# Patient Record
Sex: MALE | Race: WHITE | URBAN - METROPOLITAN AREA
[De-identification: names, ages, dates, MRNs, and addresses within clinical notes are randomized per-mention and may not be internally consistent; named-entity substitution may affect disease eponyms.]

---

## 2021-11-09 ENCOUNTER — WEB ENCOUNTER (OUTPATIENT)
Dept: URBAN - METROPOLITAN AREA CLINIC 98 | Facility: CLINIC | Age: 51
End: 2021-11-09

## 2021-11-11 ENCOUNTER — OFFICE VISIT (OUTPATIENT)
Dept: URBAN - METROPOLITAN AREA SURGERY CENTER 18 | Facility: SURGERY CENTER | Age: 51
End: 2021-11-11
Payer: COMMERCIAL

## 2021-11-11 DIAGNOSIS — Z12.11 AVERAGE RISK FOR CRC. DUE TO PT'S CO-MORBID STATE WITH END STAGE DEMENTIA, HIGH RISK FOR ANESTHESIA (PER NEUROLOGY); INABILITY TO TAKE A BOWEL PREP....WOULD NOT ADVISE ANY COLORECTAL CANCER SCREENING INCLUDING STOOL TEST FOR FECAL BLOOD.: ICD-10-CM

## 2021-11-11 DIAGNOSIS — D12.5 ADENOMA OF SIGMOID COLON: ICD-10-CM

## 2021-11-11 PROCEDURE — G8907 PT DOC NO EVENTS ON DISCHARG: HCPCS | Performed by: INTERNAL MEDICINE

## 2021-11-11 PROCEDURE — 45380 COLONOSCOPY AND BIOPSY: CPT | Performed by: INTERNAL MEDICINE

## 2021-11-15 ENCOUNTER — TELEPHONE ENCOUNTER (OUTPATIENT)
Dept: URBAN - METROPOLITAN AREA CLINIC 98 | Facility: CLINIC | Age: 51
End: 2021-11-15

## 2022-01-03 ENCOUNTER — WEB ENCOUNTER (OUTPATIENT)
Dept: URBAN - METROPOLITAN AREA CLINIC 98 | Facility: CLINIC | Age: 52
End: 2022-01-03

## 2022-01-03 ENCOUNTER — OFFICE VISIT (OUTPATIENT)
Dept: URBAN - METROPOLITAN AREA CLINIC 98 | Facility: CLINIC | Age: 52
End: 2022-01-03
Payer: COMMERCIAL

## 2022-01-03 VITALS
HEART RATE: 82 BPM | DIASTOLIC BLOOD PRESSURE: 81 MMHG | SYSTOLIC BLOOD PRESSURE: 115 MMHG | TEMPERATURE: 97.4 F | HEIGHT: 74 IN | WEIGHT: 207.4 LBS | BODY MASS INDEX: 26.62 KG/M2

## 2022-01-03 DIAGNOSIS — D12.6 ADENOMATOUS POLYP OF COLON, UNSPECIFIED PART OF COLON: ICD-10-CM

## 2022-01-03 DIAGNOSIS — Z12.11 COLON CANCER SCREENING: ICD-10-CM

## 2022-01-03 DIAGNOSIS — K64.2 GRADE III HEMORRHOIDS: ICD-10-CM

## 2022-01-03 PROCEDURE — G8482 FLU IMMUNIZE ORDER/ADMIN: HCPCS | Performed by: INTERNAL MEDICINE

## 2022-01-03 PROCEDURE — G8420 CALC BMI NORM PARAMETERS: HCPCS | Performed by: INTERNAL MEDICINE

## 2022-01-03 PROCEDURE — G9903 PT SCRN TBCO ID AS NON USER: HCPCS | Performed by: INTERNAL MEDICINE

## 2022-01-03 PROCEDURE — 99212 OFFICE O/P EST SF 10 MIN: CPT | Performed by: INTERNAL MEDICINE

## 2022-01-03 PROCEDURE — 46221 LIGATION OF HEMORRHOID(S): CPT | Performed by: INTERNAL MEDICINE

## 2022-01-03 PROCEDURE — G8427 DOCREV CUR MEDS BY ELIG CLIN: HCPCS | Performed by: INTERNAL MEDICINE

## 2022-01-03 PROCEDURE — 3017F COLORECTAL CA SCREEN DOC REV: CPT | Performed by: INTERNAL MEDICINE

## 2022-01-03 RX ORDER — TRETINOIN 0.5 MG/G
CREAM TOPICAL
Qty: 20 | Status: ACTIVE | COMMUNITY

## 2022-01-03 RX ORDER — WHEAT DEXTRIN 3 G/3.5 G
AS DIRECTED POWDER (GRAM) ORAL
OUTPATIENT

## 2022-01-03 RX ORDER — HYDROCODONE BITARTRATE AND HOMATROPINE METHYLBROMIDE 5; 1.5 MG/5ML; MG/5ML
TAKE 5 ML BY MOUTH EVERY FOUR HOURS AS NEEDED SOLUTION ORAL
Qty: 120 | Refills: 0 | Status: ACTIVE | COMMUNITY

## 2022-01-03 RX ORDER — LOTEPREDNOL ETABONATE 5 MG/ML
INSTILL ONE DROP INTO THE RIGHT EYE FOUR TIMES DAILY FOR 1 WEEK; THEN THREE TIMES A DAY FOR 1 WEEK; THEN TWO TIMES A DAY FOR 1 WEEK SUSPENSION/ DROPS OPHTHALMIC
Qty: 5 | Refills: 0 | Status: ACTIVE | COMMUNITY

## 2022-01-03 RX ORDER — AZITHROMYCIN 250 MG/1
TAKE TWO TABLETS BY MOUTH ON DAY 1, THEN TAKE ONE TABLET ONE TIME DAILY ON DAYS 2-5 TABLET, FILM COATED ORAL
Qty: 6 | Refills: 0 | Status: ACTIVE | COMMUNITY

## 2022-01-03 RX ORDER — TADALAFIL 5 MG/1
TAKE ONE TABLET BY MOUTH ONE TIME DAILY TABLET ORAL
Qty: 30 | Refills: 0 | Status: ACTIVE | COMMUNITY

## 2022-01-03 RX ORDER — VALACYCLOVIR 1 G/1
TABLET, FILM COATED ORAL
Qty: 4 | Status: ACTIVE | COMMUNITY

## 2022-01-03 RX ORDER — TOBRAMYCIN AND DEXAMETHASONE 3; 1 MG/ML; MG/ML
INSTILL ONE DROP TO THE AFFECTED EYE(S) EVERY 6 HOURS SUSPENSION/ DROPS OPHTHALMIC
Qty: 5 | Refills: 0 | Status: ACTIVE | COMMUNITY

## 2022-01-03 NOTE — HPI-TODAY'S VISIT:
f/u visit S/p colonoscop 11/2021 with on TA removed Hemorrhoids noted on that exam He would like to know difference between metamucil and benefiber Taking metamucil without issue He has occ bleeding and protrusion that requires manual placement Occ irritation Does sit at work quite a bit Sits on toilet for less than 5min  No straining with BMs

## 2022-01-24 ENCOUNTER — OFFICE VISIT (OUTPATIENT)
Dept: URBAN - METROPOLITAN AREA CLINIC 98 | Facility: CLINIC | Age: 52
End: 2022-01-24
Payer: COMMERCIAL

## 2022-01-24 VITALS
SYSTOLIC BLOOD PRESSURE: 120 MMHG | BODY MASS INDEX: 26.15 KG/M2 | HEIGHT: 74 IN | TEMPERATURE: 97.2 F | WEIGHT: 203.8 LBS | DIASTOLIC BLOOD PRESSURE: 87 MMHG | HEART RATE: 84 BPM

## 2022-01-24 DIAGNOSIS — Z12.11 COLON CANCER SCREENING: ICD-10-CM

## 2022-01-24 DIAGNOSIS — D12.6 ADENOMATOUS POLYP OF COLON, UNSPECIFIED PART OF COLON: ICD-10-CM

## 2022-01-24 DIAGNOSIS — K64.9 HEMORRHOIDS: ICD-10-CM

## 2022-01-24 PROCEDURE — 46221 LIGATION OF HEMORRHOID(S): CPT | Performed by: INTERNAL MEDICINE

## 2022-01-24 PROCEDURE — 99213 OFFICE O/P EST LOW 20 MIN: CPT | Performed by: INTERNAL MEDICINE

## 2022-01-24 RX ORDER — HYDROCODONE BITARTRATE AND HOMATROPINE METHYLBROMIDE 5; 1.5 MG/5ML; MG/5ML
TAKE 5 ML BY MOUTH EVERY FOUR HOURS AS NEEDED SOLUTION ORAL
Qty: 120 | Refills: 0 | Status: ACTIVE | COMMUNITY

## 2022-01-24 RX ORDER — TADALAFIL 5 MG/1
TAKE ONE TABLET BY MOUTH ONE TIME DAILY TABLET ORAL
Qty: 30 | Refills: 0 | Status: ACTIVE | COMMUNITY

## 2022-01-24 RX ORDER — AZITHROMYCIN 250 MG/1
TAKE TWO TABLETS BY MOUTH ON DAY 1, THEN TAKE ONE TABLET ONE TIME DAILY ON DAYS 2-5 TABLET, FILM COATED ORAL
Qty: 6 | Refills: 0 | Status: ACTIVE | COMMUNITY

## 2022-01-24 RX ORDER — VALACYCLOVIR 1 G/1
TABLET, FILM COATED ORAL
Qty: 4 | Status: ACTIVE | COMMUNITY

## 2022-01-24 RX ORDER — WHEAT DEXTRIN 3 G/3.5 G
AS DIRECTED POWDER (GRAM) ORAL
OUTPATIENT

## 2022-01-24 RX ORDER — LOTEPREDNOL ETABONATE 5 MG/ML
INSTILL ONE DROP INTO THE RIGHT EYE FOUR TIMES DAILY FOR 1 WEEK; THEN THREE TIMES A DAY FOR 1 WEEK; THEN TWO TIMES A DAY FOR 1 WEEK SUSPENSION/ DROPS OPHTHALMIC
Qty: 5 | Refills: 0 | Status: ACTIVE | COMMUNITY

## 2022-01-24 RX ORDER — TOBRAMYCIN AND DEXAMETHASONE 3; 1 MG/ML; MG/ML
INSTILL ONE DROP TO THE AFFECTED EYE(S) EVERY 6 HOURS SUSPENSION/ DROPS OPHTHALMIC
Qty: 5 | Refills: 0 | Status: ACTIVE | COMMUNITY

## 2022-01-24 RX ORDER — WHEAT DEXTRIN 3 G/3.5 G
AS DIRECTED POWDER (GRAM) ORAL
Status: ACTIVE | COMMUNITY

## 2022-01-24 RX ORDER — TRETINOIN 0.5 MG/G
CREAM TOPICAL
Qty: 20 | Status: ACTIVE | COMMUNITY

## 2022-01-24 NOTE — HPI-TODAY'S VISIT:
f/u visit s/p RP hemorrhoid banding Doing well No concerns  Back to metamucil  . PRIOR VISIT: S/p colonoscop 11/2021 with on TA removed Hemorrhoids noted on that exam He would like to know difference between metamucil and benefiber Taking metamucil without issue He has occ bleeding and protrusion that requires manual placement Occ irritation Does sit at work quite a bit Sits on toilet for less than 5min  No straining with BMs

## 2022-01-30 ENCOUNTER — WEB ENCOUNTER (OUTPATIENT)
Dept: URBAN - METROPOLITAN AREA CLINIC 98 | Facility: CLINIC | Age: 52
End: 2022-01-30

## 2022-02-14 ENCOUNTER — OFFICE VISIT (OUTPATIENT)
Dept: URBAN - METROPOLITAN AREA CLINIC 98 | Facility: CLINIC | Age: 52
End: 2022-02-14
Payer: COMMERCIAL

## 2022-02-14 VITALS
HEIGHT: 74 IN | BODY MASS INDEX: 25.8 KG/M2 | HEART RATE: 76 BPM | TEMPERATURE: 97 F | SYSTOLIC BLOOD PRESSURE: 120 MMHG | WEIGHT: 201 LBS | DIASTOLIC BLOOD PRESSURE: 81 MMHG

## 2022-02-14 DIAGNOSIS — D12.6 ADENOMATOUS POLYP OF COLON, UNSPECIFIED PART OF COLON: ICD-10-CM

## 2022-02-14 DIAGNOSIS — Z12.11 COLON CANCER SCREENING: ICD-10-CM

## 2022-02-14 DIAGNOSIS — K64.2 GRADE III HEMORRHOIDS: ICD-10-CM

## 2022-02-14 PROCEDURE — 46221 LIGATION OF HEMORRHOID(S): CPT | Performed by: INTERNAL MEDICINE

## 2022-02-14 RX ORDER — TOBRAMYCIN AND DEXAMETHASONE 3; 1 MG/ML; MG/ML
INSTILL ONE DROP TO THE AFFECTED EYE(S) EVERY 6 HOURS SUSPENSION/ DROPS OPHTHALMIC
Qty: 5 | Refills: 0 | Status: ACTIVE | COMMUNITY

## 2022-02-14 RX ORDER — HYDROCODONE BITARTRATE AND HOMATROPINE METHYLBROMIDE 5; 1.5 MG/5ML; MG/5ML
TAKE 5 ML BY MOUTH EVERY FOUR HOURS AS NEEDED SOLUTION ORAL
Qty: 120 | Refills: 0 | Status: ACTIVE | COMMUNITY

## 2022-02-14 RX ORDER — TRETINOIN 0.5 MG/G
CREAM TOPICAL
Qty: 20 | Status: ACTIVE | COMMUNITY

## 2022-02-14 RX ORDER — AZITHROMYCIN 250 MG/1
TAKE TWO TABLETS BY MOUTH ON DAY 1, THEN TAKE ONE TABLET ONE TIME DAILY ON DAYS 2-5 TABLET, FILM COATED ORAL
Qty: 6 | Refills: 0 | Status: ACTIVE | COMMUNITY

## 2022-02-14 RX ORDER — LOTEPREDNOL ETABONATE 5 MG/ML
INSTILL ONE DROP INTO THE RIGHT EYE FOUR TIMES DAILY FOR 1 WEEK; THEN THREE TIMES A DAY FOR 1 WEEK; THEN TWO TIMES A DAY FOR 1 WEEK SUSPENSION/ DROPS OPHTHALMIC
Qty: 5 | Refills: 0 | Status: ACTIVE | COMMUNITY

## 2022-02-14 RX ORDER — WHEAT DEXTRIN 3 G/3.5 G
AS DIRECTED POWDER (GRAM) ORAL
Status: ACTIVE | COMMUNITY

## 2022-02-14 RX ORDER — WHEAT DEXTRIN 3 G/3.5 G
AS DIRECTED POWDER (GRAM) ORAL
OUTPATIENT

## 2022-02-14 RX ORDER — TADALAFIL 5 MG/1
TAKE ONE TABLET BY MOUTH ONE TIME DAILY TABLET ORAL
Qty: 30 | Refills: 0 | Status: ACTIVE | COMMUNITY

## 2022-02-14 RX ORDER — VALACYCLOVIR 1 G/1
TABLET, FILM COATED ORAL
Qty: 4 | Status: ACTIVE | COMMUNITY

## 2022-02-14 NOTE — HPI-TODAY'S VISIT:
f/u visit s/p LL and RP banding  . PRIOR VISIT: s/p RP hemorrhoid banding Doing well No concerns  Back to metamucil  . PRIOR VISIT: S/p colonoscop 11/2021 with on TA removed Hemorrhoids noted on that exam He would like to know difference between metamucil and benefiber Taking metamucil without issue He has occ bleeding and protrusion that requires manual placement Occ irritation Does sit at work quite a bit Sits on toilet for less than 5min  No straining with BMs

## 2022-02-17 ENCOUNTER — TELEPHONE ENCOUNTER (OUTPATIENT)
Dept: URBAN - METROPOLITAN AREA CLINIC 98 | Facility: CLINIC | Age: 52
End: 2022-02-17

## 2022-03-28 ENCOUNTER — OFFICE VISIT (OUTPATIENT)
Dept: URBAN - METROPOLITAN AREA CLINIC 98 | Facility: CLINIC | Age: 52
End: 2022-03-28
Payer: COMMERCIAL

## 2022-03-28 VITALS
HEART RATE: 76 BPM | HEIGHT: 74 IN | SYSTOLIC BLOOD PRESSURE: 128 MMHG | DIASTOLIC BLOOD PRESSURE: 75 MMHG | BODY MASS INDEX: 25.8 KG/M2 | WEIGHT: 201 LBS | TEMPERATURE: 96.8 F

## 2022-03-28 DIAGNOSIS — K64.2 GRADE III HEMORRHOIDS: ICD-10-CM

## 2022-03-28 DIAGNOSIS — D12.6 ADENOMATOUS POLYP OF COLON, UNSPECIFIED PART OF COLON: ICD-10-CM

## 2022-03-28 DIAGNOSIS — L91.8 SKIN TAG: ICD-10-CM

## 2022-03-28 PROBLEM — 201091002: Status: ACTIVE | Noted: 2022-03-28

## 2022-03-28 PROBLEM — 721705006: Status: ACTIVE | Noted: 2022-02-14

## 2022-03-28 PROCEDURE — G8482 FLU IMMUNIZE ORDER/ADMIN: HCPCS | Performed by: INTERNAL MEDICINE

## 2022-03-28 PROCEDURE — 1036F TOBACCO NON-USER: CPT | Performed by: INTERNAL MEDICINE

## 2022-03-28 PROCEDURE — G9903 PT SCRN TBCO ID AS NON USER: HCPCS | Performed by: INTERNAL MEDICINE

## 2022-03-28 PROCEDURE — G8420 CALC BMI NORM PARAMETERS: HCPCS | Performed by: INTERNAL MEDICINE

## 2022-03-28 PROCEDURE — 99213 OFFICE O/P EST LOW 20 MIN: CPT | Performed by: INTERNAL MEDICINE

## 2022-03-28 PROCEDURE — G8427 DOCREV CUR MEDS BY ELIG CLIN: HCPCS | Performed by: INTERNAL MEDICINE

## 2022-03-28 PROCEDURE — 3017F COLORECTAL CA SCREEN DOC REV: CPT | Performed by: INTERNAL MEDICINE

## 2022-03-28 RX ORDER — AZITHROMYCIN 250 MG/1
TAKE TWO TABLETS BY MOUTH ON DAY 1, THEN TAKE ONE TABLET ONE TIME DAILY ON DAYS 2-5 TABLET, FILM COATED ORAL
Qty: 6 | Refills: 0 | Status: ACTIVE | COMMUNITY

## 2022-03-28 RX ORDER — VALACYCLOVIR 1 G/1
TABLET, FILM COATED ORAL
Qty: 4 | Status: ACTIVE | COMMUNITY

## 2022-03-28 RX ORDER — WHEAT DEXTRIN 3 G/3.5 G
AS DIRECTED POWDER (GRAM) ORAL
Status: ACTIVE | COMMUNITY

## 2022-03-28 RX ORDER — TOBRAMYCIN AND DEXAMETHASONE 3; 1 MG/ML; MG/ML
INSTILL ONE DROP TO THE AFFECTED EYE(S) EVERY 6 HOURS SUSPENSION/ DROPS OPHTHALMIC
Qty: 5 | Refills: 0 | Status: ACTIVE | COMMUNITY

## 2022-03-28 RX ORDER — TRETINOIN 0.5 MG/G
CREAM TOPICAL
Qty: 20 | Status: ACTIVE | COMMUNITY

## 2022-03-28 RX ORDER — WHEAT DEXTRIN 3 G/3.5 G
AS DIRECTED POWDER (GRAM) ORAL
OUTPATIENT

## 2022-03-28 RX ORDER — TADALAFIL 5 MG/1
TAKE ONE TABLET BY MOUTH ONE TIME DAILY TABLET ORAL
Qty: 30 | Refills: 0 | Status: ACTIVE | COMMUNITY

## 2022-03-28 RX ORDER — HYDROCODONE BITARTRATE AND HOMATROPINE METHYLBROMIDE 5; 1.5 MG/5ML; MG/5ML
TAKE 5 ML BY MOUTH EVERY FOUR HOURS AS NEEDED SOLUTION ORAL
Qty: 120 | Refills: 0 | Status: ACTIVE | COMMUNITY

## 2022-03-28 RX ORDER — LOTEPREDNOL ETABONATE 5 MG/ML
INSTILL ONE DROP INTO THE RIGHT EYE FOUR TIMES DAILY FOR 1 WEEK; THEN THREE TIMES A DAY FOR 1 WEEK; THEN TWO TIMES A DAY FOR 1 WEEK SUSPENSION/ DROPS OPHTHALMIC
Qty: 5 | Refills: 0 | Status: ACTIVE | COMMUNITY

## 2022-03-28 NOTE — HPI-TODAY'S VISIT:
f/u visit s/p LL, RA, and RP banding He did have some swelling/irritation after last banding, but things are better No blood or irritation with BMs  . PRIOR VISIT: s/p RP hemorrhoid banding Doing well No concerns  Back to metamucil  . PRIOR VISIT: S/p colonoscop 11/2021 with on TA removed Hemorrhoids noted on that exam He would like to know difference between metamucil and benefiber Taking metamucil without issue He has occ bleeding and protrusion that requires manual placement Occ irritation Does sit at work quite a bit Sits on toilet for less than 5min  No straining with BMs

## 2023-12-26 ENCOUNTER — CLAIMS CREATED FROM THE CLAIM WINDOW (OUTPATIENT)
Dept: URBAN - METROPOLITAN AREA CLINIC 98 | Facility: CLINIC | Age: 53
End: 2023-12-26
Payer: COMMERCIAL

## 2023-12-26 ENCOUNTER — DASHBOARD ENCOUNTERS (OUTPATIENT)
Age: 53
End: 2023-12-26

## 2023-12-26 VITALS
BODY MASS INDEX: 26.09 KG/M2 | SYSTOLIC BLOOD PRESSURE: 124 MMHG | HEART RATE: 72 BPM | DIASTOLIC BLOOD PRESSURE: 84 MMHG | TEMPERATURE: 97.2 F | HEIGHT: 74 IN | WEIGHT: 203.3 LBS

## 2023-12-26 DIAGNOSIS — K64.1 GRADE II HEMORRHOIDS: ICD-10-CM

## 2023-12-26 DIAGNOSIS — Z86.010 PERSONAL HISTORY OF COLONIC POLYPS: ICD-10-CM

## 2023-12-26 DIAGNOSIS — K64.9 HEMORRHOIDS: ICD-10-CM

## 2023-12-26 DIAGNOSIS — L91.8 SKIN TAG: ICD-10-CM

## 2023-12-26 DIAGNOSIS — D12.6 ADENOMATOUS POLYP OF COLON, UNSPECIFIED PART OF COLON: ICD-10-CM

## 2023-12-26 DIAGNOSIS — Z12.11 COLON CANCER SCREENING: ICD-10-CM

## 2023-12-26 DIAGNOSIS — K64.2 GRADE III HEMORRHOIDS: ICD-10-CM

## 2023-12-26 DIAGNOSIS — K64.9 HEMORRHOIDS WITHOUT COMPLICATION: ICD-10-CM

## 2023-12-26 PROBLEM — 428283002: Status: ACTIVE | Noted: 2023-12-26

## 2023-12-26 PROCEDURE — 46221 LIGATION OF HEMORRHOID(S): CPT | Performed by: INTERNAL MEDICINE

## 2023-12-26 RX ORDER — WHEAT DEXTRIN 3 G/3.5 G
AS DIRECTED POWDER (GRAM) ORAL
OUTPATIENT

## 2023-12-26 RX ORDER — HYDROCODONE BITARTRATE AND HOMATROPINE METHYLBROMIDE 5; 1.5 MG/5ML; MG/5ML
TAKE 5 ML BY MOUTH EVERY FOUR HOURS AS NEEDED SOLUTION ORAL
Qty: 120 | Refills: 0 | Status: ACTIVE | COMMUNITY

## 2023-12-26 RX ORDER — WHEAT DEXTRIN 3 G/3.5 G
AS DIRECTED POWDER (GRAM) ORAL
Status: ACTIVE | COMMUNITY

## 2023-12-26 RX ORDER — TADALAFIL 5 MG/1
TAKE ONE TABLET BY MOUTH ONE TIME DAILY TABLET ORAL
Qty: 30 | Refills: 0 | Status: ACTIVE | COMMUNITY

## 2023-12-26 RX ORDER — LOTEPREDNOL ETABONATE 5 MG/ML
INSTILL ONE DROP INTO THE RIGHT EYE FOUR TIMES DAILY FOR 1 WEEK; THEN THREE TIMES A DAY FOR 1 WEEK; THEN TWO TIMES A DAY FOR 1 WEEK SUSPENSION/ DROPS OPHTHALMIC
Qty: 5 | Refills: 0 | Status: ACTIVE | COMMUNITY

## 2023-12-26 RX ORDER — VALACYCLOVIR 1 G/1
TABLET, FILM COATED ORAL
Qty: 4 | Status: ACTIVE | COMMUNITY

## 2023-12-26 RX ORDER — AZITHROMYCIN 250 MG/1
TAKE TWO TABLETS BY MOUTH ON DAY 1, THEN TAKE ONE TABLET ONE TIME DAILY ON DAYS 2-5 TABLET, FILM COATED ORAL
Qty: 6 | Refills: 0 | Status: ACTIVE | COMMUNITY

## 2023-12-26 RX ORDER — TOBRAMYCIN AND DEXAMETHASONE 3; 1 MG/ML; MG/ML
INSTILL ONE DROP TO THE AFFECTED EYE(S) EVERY 6 HOURS SUSPENSION/ DROPS OPHTHALMIC
Qty: 5 | Refills: 0 | Status: ACTIVE | COMMUNITY

## 2023-12-26 RX ORDER — TRETINOIN 0.5 MG/G
CREAM TOPICAL
Qty: 20 | Status: ACTIVE | COMMUNITY

## 2023-12-26 NOTE — HPI-TODAY'S VISIT:
f/u visit Last seen 3/2022.  At that time, he was s/p banding and has done very well until two weeks ago Developed some irritation in the 5 O'clock area Felt something protruding in the area He has continued fiber Symptoms have improved over the last two weeks . PRIOR VISIT: s/p LL, RA, and RP banding He did have some swelling/irritation after last banding, but things are better No blood or irritation with BMs  . PRIOR VISIT: s/p RP hemorrhoid banding Doing well No concerns  Back to metamucil  . PRIOR VISIT: S/p colonoscop 11/2021 with on TA removed Hemorrhoids noted on that exam He would like to know difference between metamucil and benefiber Taking metamucil without issue He has occ bleeding and protrusion that requires manual placement Occ irritation Does sit at work quite a bit Sits on toilet for less than 5min  No straining with BMs

## 2024-11-18 ENCOUNTER — OFFICE VISIT (OUTPATIENT)
Dept: URBAN - METROPOLITAN AREA CLINIC 98 | Facility: CLINIC | Age: 54
End: 2024-11-18
Payer: COMMERCIAL

## 2024-11-18 VITALS
BODY MASS INDEX: 26.56 KG/M2 | DIASTOLIC BLOOD PRESSURE: 77 MMHG | HEIGHT: 74 IN | HEART RATE: 94 BPM | SYSTOLIC BLOOD PRESSURE: 110 MMHG | TEMPERATURE: 98.1 F | WEIGHT: 207 LBS

## 2024-11-18 DIAGNOSIS — Z86.0101 H/O ADENOMATOUS POLYP OF COLON: ICD-10-CM

## 2024-11-18 DIAGNOSIS — K64.9 HEMORRHOIDS: ICD-10-CM

## 2024-11-18 DIAGNOSIS — D12.6 ADENOMATOUS POLYP OF COLON, UNSPECIFIED PART OF COLON: ICD-10-CM

## 2024-11-18 DIAGNOSIS — K64.2 GRADE III HEMORRHOIDS: ICD-10-CM

## 2024-11-18 DIAGNOSIS — K64.1 GRADE II HEMORRHOIDS: ICD-10-CM

## 2024-11-18 DIAGNOSIS — Z12.11 COLON CANCER SCREENING: ICD-10-CM

## 2024-11-18 DIAGNOSIS — L91.8 SKIN TAG: ICD-10-CM

## 2024-11-18 PROCEDURE — 46221 LIGATION OF HEMORRHOID(S): CPT | Performed by: INTERNAL MEDICINE

## 2024-11-18 RX ORDER — TADALAFIL 5 MG/1
TAKE ONE TABLET BY MOUTH ONE TIME DAILY TABLET ORAL
Qty: 30 | Refills: 0 | Status: ACTIVE | COMMUNITY

## 2024-11-18 RX ORDER — VALACYCLOVIR 1 G/1
TABLET, FILM COATED ORAL
Qty: 4 | Status: ACTIVE | COMMUNITY

## 2024-11-18 RX ORDER — TRETINOIN 0.5 MG/G
CREAM TOPICAL
Qty: 20 | Status: ACTIVE | COMMUNITY

## 2024-11-18 RX ORDER — HYDROCODONE BITARTRATE AND HOMATROPINE METHYLBROMIDE 5; 1.5 MG/5ML; MG/5ML
TAKE 5 ML BY MOUTH EVERY FOUR HOURS AS NEEDED SOLUTION ORAL
Qty: 120 | Refills: 0 | Status: ACTIVE | COMMUNITY

## 2024-11-18 RX ORDER — WHEAT DEXTRIN 3 G/3.5 G
AS DIRECTED POWDER (GRAM) ORAL
Status: ACTIVE | COMMUNITY

## 2024-11-18 RX ORDER — WHEAT DEXTRIN 3 G/3.5 G
AS DIRECTED POWDER (GRAM) ORAL
OUTPATIENT

## 2024-11-18 RX ORDER — LOTEPREDNOL ETABONATE 5 MG/ML
INSTILL ONE DROP INTO THE RIGHT EYE FOUR TIMES DAILY FOR 1 WEEK; THEN THREE TIMES A DAY FOR 1 WEEK; THEN TWO TIMES A DAY FOR 1 WEEK SUSPENSION/ DROPS OPHTHALMIC
Qty: 5 | Refills: 0 | Status: ACTIVE | COMMUNITY

## 2024-11-18 RX ORDER — TOBRAMYCIN AND DEXAMETHASONE 3; 1 MG/ML; MG/ML
INSTILL ONE DROP TO THE AFFECTED EYE(S) EVERY 6 HOURS SUSPENSION/ DROPS OPHTHALMIC
Qty: 5 | Refills: 0 | Status: ACTIVE | COMMUNITY

## 2024-11-18 RX ORDER — AZITHROMYCIN 250 MG/1
TAKE TWO TABLETS BY MOUTH ON DAY 1, THEN TAKE ONE TABLET ONE TIME DAILY ON DAYS 2-5 TABLET, FILM COATED ORAL
Qty: 6 | Refills: 0 | Status: ACTIVE | COMMUNITY

## 2024-11-18 NOTE — HPI-TODAY'S VISIT:
f/u visit Last seen 12/2023 Referred to [facility] for skin tags, but he has not followed up He did have some issues with some rectal pressure about two weeks ago, but resolved No bleeding or itching  . PRIOR VISIT: Last seen 3/2022.  At that time, he was s/p banding and has done very well until two weeks ago Developed some irritation in the 5 O'clock area Felt something protruding in the area He has continued fiber Symptoms have improved over the last two weeks . PRIOR VISIT: s/p LL, RA, and RP banding He did have some swelling/irritation after last banding, but things are better No blood or irritation with BMs  . PRIOR VISIT: s/p RP hemorrhoid banding Doing well No concerns  Back to metamucil  . PRIOR VISIT: S/p colonoscop 11/2021 with on TA removed Hemorrhoids noted on that exam He would like to know difference between metamucil and benefiber Taking metamucil without issue He has occ bleeding and protrusion that requires manual placement Occ irritation Does sit at work quite a bit Sits on toilet for less than 5min  No straining with BMs

## 2025-07-07 ENCOUNTER — LAB OUTSIDE AN ENCOUNTER (OUTPATIENT)
Dept: URBAN - METROPOLITAN AREA CLINIC 98 | Facility: CLINIC | Age: 55
End: 2025-07-07

## 2025-07-07 ENCOUNTER — OFFICE VISIT (OUTPATIENT)
Dept: URBAN - METROPOLITAN AREA CLINIC 98 | Facility: CLINIC | Age: 55
End: 2025-07-07
Payer: COMMERCIAL

## 2025-07-07 DIAGNOSIS — D12.6 ADENOMATOUS POLYP OF COLON, UNSPECIFIED PART OF COLON: ICD-10-CM

## 2025-07-07 DIAGNOSIS — K64.2 GRADE III HEMORRHOIDS: ICD-10-CM

## 2025-07-07 DIAGNOSIS — Z86.0101 PERSONAL HISTORY OF ADENOMATOUS AND SERRATED COLON POLYPS: ICD-10-CM

## 2025-07-07 DIAGNOSIS — K64.1 GRADE II HEMORRHOIDS: ICD-10-CM

## 2025-07-07 DIAGNOSIS — L91.8 SKIN TAG: ICD-10-CM

## 2025-07-07 DIAGNOSIS — E61.1 LOW IRON: ICD-10-CM

## 2025-07-07 DIAGNOSIS — K64.9 HEMORRHOIDS: ICD-10-CM

## 2025-07-07 PROBLEM — 165623008: Status: ACTIVE | Noted: 2025-07-07

## 2025-07-07 PROCEDURE — 99214 OFFICE O/P EST MOD 30 MIN: CPT | Performed by: INTERNAL MEDICINE

## 2025-07-07 PROCEDURE — 46221 LIGATION OF HEMORRHOID(S): CPT | Performed by: INTERNAL MEDICINE

## 2025-07-07 RX ORDER — WHEAT DEXTRIN 3 G/3.5 G
AS DIRECTED POWDER (GRAM) ORAL
OUTPATIENT
Start: 2025-07-07

## 2025-07-07 RX ORDER — AZITHROMYCIN 250 MG/1
TAKE TWO TABLETS BY MOUTH ON DAY 1, THEN TAKE ONE TABLET ONE TIME DAILY ON DAYS 2-5 TABLET, FILM COATED ORAL
Qty: 6 | Refills: 0 | Status: ON HOLD | COMMUNITY

## 2025-07-07 RX ORDER — TOBRAMYCIN AND DEXAMETHASONE 3; 1 MG/ML; MG/ML
INSTILL ONE DROP TO THE AFFECTED EYE(S) EVERY 6 HOURS SUSPENSION/ DROPS OPHTHALMIC
Qty: 5 | Refills: 0 | Status: ON HOLD | COMMUNITY

## 2025-07-07 RX ORDER — VALACYCLOVIR 1 G/1
TABLET, FILM COATED ORAL
Qty: 4 | Status: ACTIVE | COMMUNITY

## 2025-07-07 RX ORDER — TRETINOIN 0.5 MG/G
CREAM TOPICAL
Qty: 20 | Status: ACTIVE | COMMUNITY

## 2025-07-07 RX ORDER — HYDROCODONE BITARTRATE AND HOMATROPINE METHYLBROMIDE 5; 1.5 MG/5ML; MG/5ML
TAKE 5 ML BY MOUTH EVERY FOUR HOURS AS NEEDED SOLUTION ORAL
Qty: 120 | Refills: 0 | Status: ON HOLD | COMMUNITY

## 2025-07-07 RX ORDER — LOTEPREDNOL ETABONATE 5 MG/ML
INSTILL ONE DROP INTO THE RIGHT EYE FOUR TIMES DAILY FOR 1 WEEK; THEN THREE TIMES A DAY FOR 1 WEEK; THEN TWO TIMES A DAY FOR 1 WEEK SUSPENSION/ DROPS OPHTHALMIC
Qty: 5 | Refills: 0 | Status: ACTIVE | COMMUNITY

## 2025-07-07 RX ORDER — TADALAFIL 5 MG/1
TAKE ONE TABLET BY MOUTH ONE TIME DAILY TABLET ORAL
Qty: 30 | Refills: 0 | Status: ON HOLD | COMMUNITY

## 2025-07-07 RX ORDER — WHEAT DEXTRIN 3 G/3.5 G
AS DIRECTED POWDER (GRAM) ORAL
Status: ON HOLD | COMMUNITY

## 2025-07-07 NOTE — HPI-TODAY'S VISIT:
f/u visit Recent labs from Dr. GREY had a low iron level.  Normal Hb and MCV No overt GI bleeding No h/o iron def . Also interested in hemorrhoid banding Occ bothersome despite 4 bands in the past  . PRIOR VISTI: Last seen 12/2023 Referred to [facility] for skin tags, but he has not followed up He did have some issues with some rectal pressure about two weeks ago, but resolved No bleeding or itching  . PRIOR VISIT: Last seen 3/2022.  At that time, he was s/p banding and has done very well until two weeks ago Developed some irritation in the 5 O'clock area Felt something protruding in the area He has continued fiber Symptoms have improved over the last two weeks . PRIOR VISIT: s/p LL, RA, and RP banding He did have some swelling/irritation after last banding, but things are better No blood or irritation with BMs  . PRIOR VISIT: s/p RP hemorrhoid banding Doing well No concerns  Back to metamucil  . PRIOR VISIT: S/p colonoscop 11/2021 with on TA removed Hemorrhoids noted on that exam He would like to know difference between metamucil and benefiber Taking metamucil without issue He has occ bleeding and protrusion that requires manual placement Occ irritation Does sit at work quite a bit Sits on toilet for less than 5min  No straining with BMs

## 2025-07-21 ENCOUNTER — TELEPHONE ENCOUNTER (OUTPATIENT)
Dept: URBAN - METROPOLITAN AREA CLINIC 98 | Facility: CLINIC | Age: 55
End: 2025-07-21

## 2025-07-25 ENCOUNTER — TELEPHONE ENCOUNTER (OUTPATIENT)
Dept: URBAN - METROPOLITAN AREA CLINIC 98 | Facility: CLINIC | Age: 55
End: 2025-07-25